# Patient Record
Sex: FEMALE | Race: WHITE | Employment: FULL TIME | URBAN - METROPOLITAN AREA
[De-identification: names, ages, dates, MRNs, and addresses within clinical notes are randomized per-mention and may not be internally consistent; named-entity substitution may affect disease eponyms.]

---

## 2017-03-24 PROBLEM — H91.21 SUDDEN HEARING LOSS, RIGHT: Status: ACTIVE | Noted: 2017-03-24

## 2017-03-27 PROCEDURE — 86431 RHEUMATOID FACTOR QUANT: CPT | Performed by: FAMILY MEDICINE

## 2017-03-27 PROCEDURE — 86038 ANTINUCLEAR ANTIBODIES: CPT | Performed by: FAMILY MEDICINE

## 2018-07-06 PROBLEM — Z82.49 FAMILY HISTORY OF PREMATURE CORONARY ARTERY DISEASE: Status: ACTIVE | Noted: 2018-07-06

## 2019-09-21 ENCOUNTER — HOSPITAL ENCOUNTER (EMERGENCY)
Facility: HOSPITAL | Age: 49
Discharge: HOME | End: 2019-09-22
Attending: EMERGENCY MEDICINE
Payer: COMMERCIAL

## 2019-09-21 DIAGNOSIS — N30.00 ACUTE CYSTITIS WITHOUT HEMATURIA: Primary | ICD-10-CM

## 2019-09-21 DIAGNOSIS — A60.04 HERPES SIMPLEX VULVOVAGINITIS: ICD-10-CM

## 2019-09-21 LAB
ANION GAP SERPL CALC-SCNC: 9 MMOL/L (ref 0–18)
BASOPHILS # BLD AUTO: 0.04 X10(3) UL (ref 0–0.2)
BASOPHILS NFR BLD AUTO: 0.4 %
BILIRUB UR QL: NEGATIVE
BUN BLD-MCNC: 13 MG/DL (ref 7–18)
BUN/CREAT SERPL: 13.4 (ref 10–20)
CALCIUM BLD-MCNC: 8.5 MG/DL (ref 8.5–10.1)
CHLORIDE SERPL-SCNC: 108 MMOL/L (ref 98–112)
CO2 SERPL-SCNC: 23 MMOL/L (ref 21–32)
COLOR UR: YELLOW
CREAT BLD-MCNC: 0.97 MG/DL (ref 0.55–1.02)
DEPRECATED RDW RBC AUTO: 38.8 FL (ref 35.1–46.3)
EOSINOPHIL # BLD AUTO: 0 X10(3) UL (ref 0–0.7)
EOSINOPHIL NFR BLD AUTO: 0 %
ERYTHROCYTE [DISTWIDTH] IN BLOOD BY AUTOMATED COUNT: 12 % (ref 11–15)
GLUCOSE BLD-MCNC: 111 MG/DL (ref 70–99)
GLUCOSE UR-MCNC: 50 MG/DL
HAV IGM SER QL: 2 MG/DL (ref 1.6–2.6)
HCT VFR BLD AUTO: 39.7 % (ref 35–48)
HGB BLD-MCNC: 13.8 G/DL (ref 12–16)
HIV1+2 AB SPEC QL IA.RAPID: NONREACTIVE
IMM GRANULOCYTES # BLD AUTO: 0.03 X10(3) UL (ref 0–1)
IMM GRANULOCYTES NFR BLD: 0.3 %
LACTATE SERPL-SCNC: 2 MMOL/L (ref 0.4–2)
LYMPHOCYTES # BLD AUTO: 1.09 X10(3) UL (ref 1–4)
LYMPHOCYTES NFR BLD AUTO: 11.6 %
MCH RBC QN AUTO: 30.7 PG (ref 26–34)
MCHC RBC AUTO-ENTMCNC: 34.8 G/DL (ref 31–37)
MCV RBC AUTO: 88.2 FL (ref 80–100)
MONOCYTES # BLD AUTO: 1.15 X10(3) UL (ref 0.1–1)
MONOCYTES NFR BLD AUTO: 12.2 %
NEUTROPHILS # BLD AUTO: 7.11 X10 (3) UL (ref 1.5–7.7)
NEUTROPHILS # BLD AUTO: 7.11 X10(3) UL (ref 1.5–7.7)
NEUTROPHILS NFR BLD AUTO: 75.5 %
NITRITE UR QL STRIP.AUTO: NEGATIVE
OSMOLALITY SERPL CALC.SUM OF ELEC: 291 MOSM/KG (ref 275–295)
PH UR: 5 [PH] (ref 5–8)
PLATELET # BLD AUTO: 253 10(3)UL (ref 150–450)
POTASSIUM SERPL-SCNC: 3.2 MMOL/L (ref 3.5–5.1)
PROT UR-MCNC: 100 MG/DL
RBC # BLD AUTO: 4.5 X10(6)UL (ref 3.8–5.3)
RBC #/AREA URNS AUTO: 11 /HPF
SODIUM SERPL-SCNC: 140 MMOL/L (ref 136–145)
SP GR UR STRIP: 1.03 (ref 1–1.03)
UROBILINOGEN UR STRIP-ACNC: <2
WBC # BLD AUTO: 9.4 X10(3) UL (ref 4–11)
WBC #/AREA URNS AUTO: 31 /HPF

## 2019-09-21 PROCEDURE — 85025 COMPLETE CBC W/AUTO DIFF WBC: CPT | Performed by: EMERGENCY MEDICINE

## 2019-09-21 PROCEDURE — 83605 ASSAY OF LACTIC ACID: CPT | Performed by: EMERGENCY MEDICINE

## 2019-09-21 PROCEDURE — 87491 CHLMYD TRACH DNA AMP PROBE: CPT | Performed by: EMERGENCY MEDICINE

## 2019-09-21 PROCEDURE — 87086 URINE CULTURE/COLONY COUNT: CPT | Performed by: EMERGENCY MEDICINE

## 2019-09-21 PROCEDURE — 99284 EMERGENCY DEPT VISIT MOD MDM: CPT

## 2019-09-21 PROCEDURE — 96365 THER/PROPH/DIAG IV INF INIT: CPT

## 2019-09-21 PROCEDURE — 87205 SMEAR GRAM STAIN: CPT | Performed by: EMERGENCY MEDICINE

## 2019-09-21 PROCEDURE — 87106 FUNGI IDENTIFICATION YEAST: CPT | Performed by: EMERGENCY MEDICINE

## 2019-09-21 PROCEDURE — 87808 TRICHOMONAS ASSAY W/OPTIC: CPT | Performed by: EMERGENCY MEDICINE

## 2019-09-21 PROCEDURE — 80048 BASIC METABOLIC PNL TOTAL CA: CPT | Performed by: EMERGENCY MEDICINE

## 2019-09-21 PROCEDURE — 81001 URINALYSIS AUTO W/SCOPE: CPT | Performed by: EMERGENCY MEDICINE

## 2019-09-21 PROCEDURE — 96361 HYDRATE IV INFUSION ADD-ON: CPT

## 2019-09-21 PROCEDURE — 86701 HIV-1ANTIBODY: CPT | Performed by: EMERGENCY MEDICINE

## 2019-09-21 PROCEDURE — 83735 ASSAY OF MAGNESIUM: CPT | Performed by: EMERGENCY MEDICINE

## 2019-09-21 PROCEDURE — 87591 N.GONORRHOEAE DNA AMP PROB: CPT | Performed by: EMERGENCY MEDICINE

## 2019-09-21 RX ORDER — ACYCLOVIR 400 MG/1
400 TABLET ORAL 3 TIMES DAILY
Qty: 30 TABLET | Refills: 0 | Status: SHIPPED | OUTPATIENT
Start: 2019-09-21 | End: 2019-10-01

## 2019-09-21 RX ORDER — ACYCLOVIR 400 MG/1
400 TABLET ORAL ONCE
Status: COMPLETED | OUTPATIENT
Start: 2019-09-21 | End: 2019-09-21

## 2019-09-21 RX ORDER — CEPHALEXIN 500 MG/1
500 CAPSULE ORAL 2 TIMES DAILY
Qty: 14 CAPSULE | Refills: 0 | Status: SHIPPED | OUTPATIENT
Start: 2019-09-21 | End: 2019-09-28

## 2019-09-21 RX ORDER — ACETAMINOPHEN 500 MG
1000 TABLET ORAL ONCE
Status: COMPLETED | OUTPATIENT
Start: 2019-09-21 | End: 2019-09-21

## 2019-09-21 RX ORDER — POTASSIUM CHLORIDE 20 MEQ/1
40 TABLET, EXTENDED RELEASE ORAL ONCE
Status: COMPLETED | OUTPATIENT
Start: 2019-09-21 | End: 2019-09-22

## 2019-09-22 VITALS
SYSTOLIC BLOOD PRESSURE: 112 MMHG | HEART RATE: 90 BPM | HEIGHT: 65 IN | OXYGEN SATURATION: 98 % | TEMPERATURE: 99 F | RESPIRATION RATE: 16 BRPM | WEIGHT: 150 LBS | BODY MASS INDEX: 24.99 KG/M2 | DIASTOLIC BLOOD PRESSURE: 72 MMHG

## 2019-09-22 LAB
B-HCG UR QL: NEGATIVE
C TRACH DNA SPEC QL NAA+PROBE: NEGATIVE
N GONORRHOEA DNA SPEC QL NAA+PROBE: NEGATIVE

## 2019-09-22 PROCEDURE — 81025 URINE PREGNANCY TEST: CPT

## 2019-09-22 RX ORDER — DIPHENHYDRAMINE HCL 25 MG
25 CAPSULE ORAL ONCE
Status: COMPLETED | OUTPATIENT
Start: 2019-09-22 | End: 2019-09-22

## 2019-09-22 NOTE — ED PROVIDER NOTES
Patient Seen in: [facility] AND [facility] Emergency Department    History   Patient presents with:  Fever (infectious)    Stated Complaint: fever   HPI    80-year-old female with past medical history of fibroids, anxiety presenting for evaluation of approximately Smoking status: Never Smoker      Smokeless tobacco: Never Used    Alcohol use: No      Alcohol/week: 0.0 standard drinks      Comment: occ    Drug use: No      Review of Systems :  Constitutional: As per HPI   Gastrointestinal: Negative for vomiting and a Glucose Urine 50  (*)     Ketones Urine Trace (*)     Blood Urine Moderate (*)     Protein Urine 100  (*)     Leukocyte Esterase Urine Large (*)     WBC Urine 31 (*)     RBC URINE 11 (*)     Bacteria Urine Few (*)     All other components within normal albarado MDM     DIFFERENTIAL DIAGNOSIS: After history and physical exam differential diagnosis includes but is not limited to UTI, pyelonephritis, STI, HSV, HIV.     Pulse ox: 97%:Normal on room air, as interpreted by myself    Elevation for complaints of fever

## 2019-09-22 NOTE — ED INITIAL ASSESSMENT (HPI)
Intermittent fever x10 day w/ cold sores around her mouth. Pt is concerned because last night she had painful consensual sex and noticed she has small red bumps to vulva.

## 2019-09-24 LAB — TRICHOMONAS SCREEN: NEGATIVE

## 2024-04-29 ENCOUNTER — TELEPHONE (OUTPATIENT)
Dept: WOUND CARE | Facility: HOSPITAL | Age: 54
End: 2024-04-29

## 2024-04-29 NOTE — TELEPHONE ENCOUNTER
Received patient information for one of our Physician on Sunday 4/28/24. Patient need to get in for HBO for sudden hearing loss. Called the patient today 4/29/24 and offered her multiple appointments today and patient decline. She said she has Doctors appointment all day today trying to save her hearing. Patients ask if she can be seen at night and I told her our clinic hours is from 7:00 AM-5:30 PM.  Patient has an appointment with us on Wednesday at 15:30 for the consultation.

## 2024-05-01 ENCOUNTER — HBO TECH VISIT (OUTPATIENT)
Dept: WOUND CARE | Facility: HOSPITAL | Age: 54
End: 2024-05-01
Attending: INTERNAL MEDICINE
Payer: COMMERCIAL

## 2024-05-01 VITALS
TEMPERATURE: 99 F | WEIGHT: 170 LBS | SYSTOLIC BLOOD PRESSURE: 119 MMHG | HEART RATE: 103 BPM | DIASTOLIC BLOOD PRESSURE: 67 MMHG | RESPIRATION RATE: 16 BRPM | BODY MASS INDEX: 28.32 KG/M2 | HEIGHT: 65 IN

## 2024-05-01 DIAGNOSIS — R94.31 ABNORMAL EKG: Primary | ICD-10-CM

## 2024-05-01 DIAGNOSIS — H90.A22 SENSORINEURAL HEARING LOSS (SNHL) OF LEFT EAR WITH RESTRICTED HEARING OF RIGHT EAR: ICD-10-CM

## 2024-05-01 PROCEDURE — 99212 OFFICE O/P EST SF 10 MIN: CPT

## 2024-05-01 NOTE — PROGRESS NOTES
.Weekly Wound Education Note    Teaching Provided To: Patient  Training Topics: HBO  Training Method: Explain/Verbal;Written  Training Response: Patient responds and understands        Notes: Pt here for HBO consult for sudden hearing loss. Per provider ok to dive after pt gets ECHO done. Pt was seen by  for tech consult.

## 2024-05-01 NOTE — PROGRESS NOTES
Pt.given hyperbaric safety and technical consultation along with pt. Education. Pt. Indicated understanding. Pt. Not starting until after echo results and insurance approval

## 2024-05-01 NOTE — PROGRESS NOTES
[facility]   HYPERBARIC OXYGEN THERAPY CONSULTATION NOTE  DOREEN WHITLEY MD FACP  5/1/2024    Subjective   Fe Infante is a 53 year old female.    Chief Complaint   Patient presents with    Wound Care     Patient is here for an HBO consult. She presents with sudden hearing loss that began 4/10/24. She had an episode of the same in 2017. She currently has no hearing in her right ear.      HPI    54 YO CM HERE FOR HBO CONSULT FOR SNHL, LEFT - symptoms started April 10th as impaired hearing - being managed conservatively with Prednisone - seen by ENT Dr. Ram consult note reviewed.   No improvement in symptoms - hence referred for HBO.     No major medical issues - however she tried HBO in 2017 when she had SNHL right - she had to discontinue middle of first treatment due to severe  sinus pressure    Cxr recent - WNL    EKG - recent - NSR NSSTWA    Diabetes status: no    Smoker status: no    Patient here for HBOT evaluation.  Reason for HBOT :  SNHL     Does the patient have a prior history of HBO treatments?  one treatment - partly.      Does the patient have a history of ear disease including prior tympanic membrane injury of pressure equalization tubes?  no     Does the patient have a history of problems equalizing his ears during air travel?  no     Does the patient have a history of claustrophobia?  no     Does the patient have a history of uncorrected cataracts? no     Does the patient have a history of pneumothorax?  no     Does the patient have a history of seizures?  no     Does the patient have a history of cardiomyopathy?  no     Is the patient on Home Oxygen?  no     Chemotherapy? no     Radiation therapy? no  Past Medical history, Surgical history, Social history, Family history reviewed with patient.   Medications reviewed.   Epic chart notes including provider notes, labs, imaging etc. Reviewed.   [facility] care everywhere queried and results reviewed.     Past Medical Hx:  Past Medical  History:    Acne    dermatology    Fibroid    OTHER DISEASES    faints with blood draws, rarely faints q 4 years.      Past Surgical Hx:  Past Surgical History:   Procedure Laterality Date       and 2008    , 2008  borderline DM test    Colonoscopy,biopsy N/A 2016    Procedure: COLONOSCOPY, POSSIBLE BIOPSY, POSSIBLE POLYPECTOMY 84242;  Surgeon: Jose Ellsworth MD;  Location: [facility], [facility]    Implant left  2016    Implant right  3-16    Other surgical history  2014    abdominoplasty cosmetic    Other surgical history Bilateral 2014    breast augmentation     Problem List:  Patient Active Problem List   Diagnosis    Acne    Sudden hearing loss, right    Family history of premature coronary artery disease     Social History:  Social History     Socioeconomic History    Marital status:    Tobacco Use    Smoking status: Never    Smokeless tobacco: Never   Vaping Use    Vaping status: Never Used   Substance and Sexual Activity    Alcohol use: No     Alcohol/week: 0.0 standard drinks of alcohol     Comment: occ    Drug use: No    Sexual activity: Yes     Partners: Male     Birth control/protection: Vasectomy     Social Determinants of Health     Food Insecurity: Low Risk  (2024)    Received from [facility]     Within the past 12 months, the food you bought just didn't last and you didn't have money to get more.: 3     Within the past 12 months, you worried that your food would run out before you got money to buy more.: 3   Transportation Needs: Not At Risk (2024)    Received from [facility] Transportation Needs     In the past 12 months, has lack of reliable transportation kept you from medical appointments, meetings, work or from getting things needed for daily living?: No   Housing Stability: Not At Risk (2024)    Received from [facility] Housing     What is your living situation today?: I have a steady place to live      Think about the place you live. Do you have problems with any of the following?: None of the above     Family History:  Family History   Problem Relation Age of Onset    Diabetes Father     Heart Disorder Maternal Grandmother         tachycardia    Diabetes Mother     Other (Other) Sister         adhd half sister    Diabetes Paternal Grandmother     Breast Cancer Paternal Grandmother 60        Age 60     Allergies:  Allergies   Allergen Reactions    Nitrofurantoin OTHER (SEE COMMENTS)     Lungs filled with fluid, tachycardia     Current Meds:  Current Outpatient Medications   Medication Sig Dispense Refill    CLONAZEPAM 0.5 MG Oral Tab TAKE 1 TABLET BY MOUTH EVERY 8 HOURS AS NEEDED 30 tablet 0    DOXYCYCLINE OR Take by mouth.      ketoconazole 2 % External Cream Apply 1 Application topically daily. 60 g 1     Tobacco Counseling:  Counseling given: Not Answered       REVIEW OF SYSTEMS:   CONSTITUTIONAL:  Denies unusual weight gain/loss, fever, chills, or fatigue.  EENT:  Eyes:  Denies eye pain, visual loss, blurred vision, double vision or yellow sclerae.   CARDIOVASCULAR:  Denies chest pain, chest pressure, chest discomfort, palpitations, dyspnea on exertion or at rest.  RESPIRATORY:  Denies shortness of breath, wheezing, cough or sputum.  GASTROINTESTINAL:  Denies abdominal pain, nausea, vomiting, constipation, diarrhea, or blood in stool.  MUSCULOSKELETAL:  Denies weakness  NEUROLOGICAL:  Denies headache, seizures, dizziness, syncope      Objective   Objective  Physical Exam    Wound Assessment          PHYSICAL EXAM:   /67   Pulse 103   Temp 98.5 °F (36.9 °C)   Resp 16   Ht 65\"   Wt 170 lb (77.1 kg)   BMI 28.29 kg/m²  Estimated body mass index is 28.29 kg/m² as calculated from the following:    Height as of this encounter: 65\".    Weight as of this encounter: 170 lb (77.1 kg).   Vital signs reviewed.Appears stated age, well groomed.  Physical Exam:  GEN:  Patient is alert, awake and oriented, well  developed, well nourished, no apparent distress.  HEENT:  Head:  Normocephalic, atraumatic   Eyes: EOMI, PERRLA, no scleral icterus, conjunctivae clear bilaterally, no eye discharge  EARS: EAC and TM b/l WNL  NECK: Supple, no CLAD, no JVD, no thyromegaly.  HEART:  Regular rate and rhythm, no murmurs, rubs or gallops.  LUNGS: Clear to auscultation bilterally, no rales/rhonchi/wheezing.    NEURO:  No deficit, normal gait, strength grossly intact.     Assessment   Assessment    Encounter Diagnosis  1. Abnormal EKG    2. Sensorineural hearing loss (SNHL) of left ear with restricted hearing of right ear        Problem List  Patient Active Problem List   Diagnosis    Acne    Sudden hearing loss, right    Family history of premature coronary artery disease       Plan    2d echo.   HBO tech consult.   Ok to start HBO when scheduled.   I explained to pt that the benefit from HBO maybe limited as she is already 3 weeks rom beginning of symptoms    Patient is an appropriate candidate for hyperbaric oxygen therapy. Hyperbaric Oxygen Therapy. HBO would be an essential adjunct in the resolution and treatment of this patient's SNHL. This patient has sufficient physiologic and psychological stamina to undergo the rigors of Hyperbaric Oxygen Therapy. As such I recommend the following: - 30 Hyperbaric Oxygen Treatments at 2.5 DANN in 100% Oxygen for 90 minutes per treatment with air break.     HBOT informed consent-  I have discussed the possible benefits of hyperbaric oxygen therapy with this patient. I have also presented and described the risks, including: Air Gas Embolism, Pneumothorax, Central Nervous System and Pulmonary Oxygen Toxicity, Flash Pulmonary Edema, Hypoglycemia, Reversible Visual Refractive Changes, Ear and Sinus Valery-Trauma, and Confinement Anxiety. The patient has verbalized understanding of these risks, and is still willing to undergo hyperbaric oxygen therapy. The patient understands the significant time and  transportation commitment involved in daily treatments of up to two hours duration and has stated that they are willing to commit to this therapy.     Patient/Caregiver Education: There are no barriers to learning. Medical education for above diagnosis given.   Answered all questions.    Outcome: Patient verbalizes understanding. Patient is notified to call with any questions, complications, allergies, or worsening or changing symptoms.  Patient is to call with any side effects or complications as a result of the treatments today.      DOCUMENTATION OF TIME SPENT: Code selection for this visit was based on time spent : 45 min on date of service in preparing to see the patient, obtaining and/or reviewing separately obtained history, performing a medically appropriate examination, counseling and educating the patient/family/caregiver, ordering medications or testing, referring and communicating with other healthcare providers, documenting clinical information in the E HR, independently interpreting results and communicating results to the patient/family/caregiver and care coordination with the patient's other providers.    Followup: Return for HBO treatments when scheduled.      Note to Patient:  The 21st Century Cures Act makes medical notes like these available to patients in the interest of transparency. However, be advised this is a medical document and is intended as [id number] communication; it is written in medical language and may appear blunt, direct, or contain abbreviations or verbiage that are unfamiliar. Medical documents are intended to carry relevant information, facts as evident, and the clinical opinion of the practitioner.    Also, please note that this report has been produced using speech recognition software and may contain errors related to that system including, but not limited to, errors in grammar, punctuation, and spelling, as well as words and phrases that possibly may have been recognized  inappropriately.  If there are any questions or concerns, contact the dictating provider for clarification.      Ashley Valdes MD  5/1/2024  2:03 PM